# Patient Record
Sex: MALE | Race: WHITE | NOT HISPANIC OR LATINO | Employment: FULL TIME | URBAN - METROPOLITAN AREA
[De-identification: names, ages, dates, MRNs, and addresses within clinical notes are randomized per-mention and may not be internally consistent; named-entity substitution may affect disease eponyms.]

---

## 2018-01-15 NOTE — PROGRESS NOTES
Assessment    1  Encounter for preventive health examination (V70 0) (Z00 00)   2  Lightheadedness (780 4) (R42)   3  Elevated blood pressure reading without diagnosis of hypertension (796 2) (R03 0)    Plan   Lightheadedness    · (1) CBC/PLT/DIFF; Status:Active; Requested for:05Jul2016;    · ([facility]) CMP14+eGFR; Status:Active; Requested for:05Jul2016;   Screening cholesterol level    · (LC) Lipid Panel; Status:Active; Requested for:05Jul2016;   Screening for diabetes mellitus (DM)    · (LC) Hemoglobin A1c; Status:Active; Requested for:05Jul2016; Adacel 5-2-15 5 LF-MCG/0 5 Intramuscular Suspension; INJECT 0 5  ML Intramuscular; Dose:  5ml; Route: Intramuscular; Site: Left Deltoid; Done: [id number] 11:00AM; Status: Complete - Retrospective Authorization;  Ordered  For: Need for Tdap vaccination; Ordered By:Oliva Sofia; Effective Date:05Jul2016; Administered by: Madeline Guzman: 7/5/2016 11:00:00 AM; Last Updated By: Madeline Guzman; 7/5/2016 12:43:53 PM     Discussion/Summary  Impression: health maintenance visit, healthy adult male  Currently, he eats an adequate diet and has an adequate exercise regimen  Colorectal cancer screening: colorectal cancer screening is not indicated  Screening lab work includes glucose and lipid profile  The immunizations are needed and immunizations will be given as outlined in the orders  Advice and education were given regarding nutrition, aerobic exercise and reproductive health  Patient discussion: discussed with the patient  BP elevated even on recheck  Possible correlation to lightheadedness  Other possible cause of lightheadedness may be viral given GI symptoms  Will f/u in 2 weeks for BP check  Instructed to try to take BP outside office a few times before next appointment  Will order screening labs given family h/o CAD  Possible side effects of new medications were reviewed with the patient/guardian today        Chief Complaint  Patient is here for PE, no forms History of Present Illness  HM, Adult Male: The patient is being seen for a health maintenance evaluation  General Health: The patient's health since the last visit is described as good  He has regular dental visits  He denies vision problems  He denies hearing loss  Immunizations status: not up to date The patient needs the following immunization(s): tetanus vaccine  Lifestyle:  He consumes a diverse and healthy diet  He exercises regularly  He does not use tobacco  He consumes alcohol  He reports occasional alcohol use  He denies drug use  Reproductive health:  the patient is sexually active  birth control is being practiced  Screening: Prostate cancer screening includes no previous evaluation  Colorectal cancer screening includes no previous screening  Metabolic screening includes no previous lipid profile, no previous glucose screening and no previous thyroid function test      Cardiovascular risk factors: family history of cardiovascular disease  Risk findings: no unsafe sexual behavior, no anxiety symptoms, no depression symptoms, no travel to developing areas and no tuberculosis exposure  HPI: Has been feeling lightheadedness, dizzy, nauseated  Having frequent BMs  Loose  Denies blood in stool  No fainting episodes  Feels like he may faint  Has not blacked out  Denies balance issues  Denies vomiting  No sick contacts  Denies fever,chills  Has been drinking a lot of water  Appetite has been normal  No alcohol  Has been getting HA's more frequently  No chest pain, palpitations, sob  Denies abdominal pain  Family h/o CAD  Review of Systems    Constitutional: no fever, not feeling poorly, no chills and not feeling tired  Eyes: No complaints of eye pain, no red eyes, no discharge from eyes, no itchy eyes  ENT: no complaints of earache, no hearing loss, no nosebleeds, no nasal discharge, no sore throat, no hoarseness  Cardiovascular: as noted in HPI     Respiratory: as noted in HPI, no cough and no wheezing  Gastrointestinal: as noted in HPI  Genitourinary: No complaints of dysuria, no incontinence, no hesitancy, no nocturia, no genital lesion, no testicular pain  Musculoskeletal: No complaints of arthralgia, no myalgias, no joint swelling or stiffness, no limb pain or swelling  Integumentary: no rashes and no skin lesions  Neurological: as noted in HPI  Psychiatric: no anxiety, no sleep disturbances and no depression  Active Problems    1  Hemorrhoid (455 6) (K64 9)    Family History  Grandmother    · Family history of Coronary artery disease   · Family history of diabetes mellitus (V18 0) (Z83 3)   · Family history of hypertension (V17 49) (Z82 49)   · Family history of malignant neoplasm of breast (V16 3) (Z80 3)  Grandfather    · Family history of Coronary artery disease   · Family history of diabetes mellitus (V18 0) (Z83 3)   · Family history of hypertension (V17 49) (Z82 49)    Social History    · Denied: History of Alcohol use   · College student   · Denied: History of Drug use   · Never a smoker    Current Meds   1  No Reported Medications Recorded    Allergies    1  No Known Drug Allergies    Vitals   Recorded: [id number] 10:15AM Recorded: [id number] 09:47AM   Temperature  97 3 F, Tympanic   Heart Rate  60   Systolic 613 148   Diastolic 98 90   Height  6 ft 1 in   Weight  193 lb 9 6 oz   BMI Calculated  25 54   BSA Calculated  2 12     Physical Exam    Constitutional   General appearance: No acute distress, well appearing and well nourished  Head and Face   Head and face: Normal     Eyes   Conjunctiva and lids: No erythema, swelling or discharge  Pupils and irises: Equal, round, reactive to light  Ears, Nose, Mouth, and Throat   External inspection of ears and nose: Normal     Otoscopic examination: Tympanic membranes translucent with normal light reflex  Canals patent without erythema  Lips, teeth, and gums: Normal, good dentition      Oropharynx: Normal with no erythema, edema, exudate or lesions  Neck   Neck: Supple, symmetric, trachea midline, no masses  Thyroid: Normal, no thyromegaly  Pulmonary   Respiratory effort: No increased work of breathing or signs of respiratory distress  Auscultation of lungs: Clear to auscultation  Cardiovascular   Auscultation of heart: Normal rate and rhythm, normal S1 and S2, no murmurs  Carotid pulses: 2+ bilaterally  no bruit heard over the right carotid and no bruit heard over the left carotid  Examination of extremities for edema and/or varicosities: Normal     Abdomen   Abdomen: Non-tender, no masses  Liver and spleen: No hepatomegaly or splenomegaly  Lymphatic   Palpation of lymph nodes in neck: No lymphadenopathy  Musculoskeletal   Gait and station: Normal     Muscle strength/tone: Normal     Skin   Skin and subcutaneous tissue: Normal without rashes or lesions  Palpation of skin and subcutaneous tissue: Normal turgor  Neurologic   Cranial nerves: Cranial nerves 2-12 intact  Reflexes: 2+ and symmetric  Psychiatric   Judgment and insight: Normal     Orientation to person, place and time: Normal     Mood and affect: Normal        Results/Data  PHQ-2 Adult Depression Screening [id number] 09:49AM User, s     Test Name Result Flag Reference   PHQ-2 Adult Depression Score 1     Over the last two weeks, how often have you been bothered by any of the following problems? Little interest or pleasure in doing things: Several days - 1  Feeling down, depressed, or hopeless: Not at all - 0   PHQ-2 Adult Depression Screening Negative         Procedure    Procedure: Visual Acuity Test    Indication: routine screening  Inforrmation supplied by a Snellen chart  Results: 20/20 in the right eye without corrective device, 20/20 in the left eye without corrective device normal in both eyes        Signatures   Electronically signed by : Ana Weeks, [facility]; Jul 5 2016 12:19PM EST (Author)    Electronically signed by : Krissy Hawk DO; Jul 5 2016 12:46PM EST                       (Author)

## 2018-01-17 NOTE — RESULT NOTES
Message   Please call pt and let him know his bad cholesterol is high and good cholesterol low  He needs to schedule the BP f/u in 2 weeks as I advised and we can discuss in more detail        Verified Results  (1) CBC/PLT/DIFF [id number] 12:00AM Mary Jane Barrientos     Test Name Result Flag Reference   WBC 6 5 x10E3/uL  3 4-10 8   RBC 4 78 x10E6/uL  4 14-5 80   Hemoglobin 14 5 g/dL  12 6-17 7   Hematocrit 42 6 %  37 5-51 0   MCV 89 fL  79-97   MCH 30 3 pg  26 6-33 0   MCHC 34 0 g/dL  31 5-35 7   RDW 13 3 %  12 3-15 4   Platelets 907 I49A3/OP  150-379   Neutrophils 38 %     Lymphs 50 %     Monocytes 8 %     Eos 3 %     Basos 1 %     Neutrophils (Absolute) 2 5 x10E3/uL  1 4-7 0   Lymphs (Absolute) 3 2 x10E3/uL H 0 7-3 1   Monocytes(Absolute) 0 5 x10E3/uL  0 1-0 9   Eos (Absolute) 0 2 x10E3/uL  0 0-0 4   Baso (Absolute) 0 0 x10E3/uL  0 0-0 2   Immature Granulocytes 0 %     Immature Grans (Abs) 0 0 x10E3/uL  0 0-0 1     (LC) CMP14+eGFR [id number] 12:00AM Kimberlyn Nicky     Test Name Result Flag Reference   Glucose, Serum 87 mg/dL  65-99   BUN 12 mg/dL  6-20   Creatinine, Serum 0 90 mg/dL  0 76-1 27   eGFR If NonAfricn Am 122 mL/min/1 73  >59   eGFR If Africn Am 141 mL/min/1 73  >59   BUN/Creatinine Ratio 13  8-19   Sodium, Serum 144 mmol/L  134-144   Potassium, Serum 4 8 mmol/L  3 5-5 2   Chloride, Serum 103 mmol/L     Carbon Dioxide, Total 25 mmol/L  18-29   Calcium, Serum 9 3 mg/dL  8 7-10 2   Protein, Total, Serum 7 3 g/dL  6 0-8 5   Albumin, Serum 4 5 g/dL  3 5-5 5   Globulin, Total 2 8 g/dL  1 5-4 5   A/G Ratio 1 6  1 1-2 5   Bilirubin, Total 0 2 mg/dL  0 0-1 2   Alkaline Phosphatase, S 81 IU/L     AST (SGOT) 27 IU/L  0-40   ALT (SGPT) 22 IU/L  0-44     (LC) Lipid Panel [id number] 12:00AM Nicky Sofia     Test Name Result Flag Reference   Cholesterol, Total 202 mg/dL H 100-199   Triglycerides 142 mg/dL  0-149   HDL Cholesterol 36 mg/dL L >39   According to ATP-III Guidelines, HDL-C >59 mg/dL is considered a  negative risk factor for CHD     VLDL Cholesterol Derrick 28 mg/dL  5-40   LDL Cholesterol Calc 138 mg/dL H 0-99

## 2020-07-24 ENCOUNTER — OFFICE VISIT (OUTPATIENT)
Dept: FAMILY MEDICINE CLINIC | Facility: HOSPITAL | Age: 25
End: 2020-07-24
Payer: COMMERCIAL

## 2020-07-24 VITALS
BODY MASS INDEX: 29.34 KG/M2 | WEIGHT: 221.4 LBS | DIASTOLIC BLOOD PRESSURE: 78 MMHG | HEIGHT: 73 IN | SYSTOLIC BLOOD PRESSURE: 124 MMHG | HEART RATE: 94 BPM | TEMPERATURE: 97.7 F

## 2020-07-24 DIAGNOSIS — Z13.6 SCREENING FOR CARDIOVASCULAR CONDITION: ICD-10-CM

## 2020-07-24 DIAGNOSIS — Z13.1 SCREENING FOR DIABETES MELLITUS (DM): ICD-10-CM

## 2020-07-24 DIAGNOSIS — N50.812 TESTICULAR PAIN, LEFT: ICD-10-CM

## 2020-07-24 DIAGNOSIS — Z00.00 ANNUAL PHYSICAL EXAM: Primary | ICD-10-CM

## 2020-07-24 PROCEDURE — 99395 PREV VISIT EST AGE 18-39: CPT | Performed by: FAMILY MEDICINE

## 2020-07-24 PROCEDURE — 3008F BODY MASS INDEX DOCD: CPT | Performed by: FAMILY MEDICINE

## 2020-07-24 NOTE — PROGRESS NOTES
Yana Sharp MD    NAME: Tino Strong  AGE: 22 y o  SEX: male  : 1995     DATE: 2020     Assessment and Plan:     Problem List Items Addressed This Visit     None      Visit Diagnoses     Annual physical exam    -  Primary    Testicular pain, left        as he relates probably chronic epididymitis  US of the scrotum ordered  f/u with urology  Previous STI testing negative  Relevant Orders    Ambulatory referral to Urology    US scrotum and testicles    Screening for cardiovascular condition        Relevant Orders    CBC    Lipid Panel with Direct LDL reflex    Comprehensive metabolic panel    Screening for diabetes mellitus (DM)        Relevant Orders    Lipid Panel with Direct LDL reflex    Comprehensive metabolic panel          Immunizations and preventive care screenings were discussed with patient today  Appropriate education was printed on patient's after visit summary  Counseling:  Alcohol/drug use: discussed moderation in alcohol intake, the recommendations for healthy alcohol use, and avoidance of illicit drug use  Dental Health: discussed importance of regular tooth brushing, flossing, and dental visits  Injury prevention: discussed safety/seat belts, safety helmets, smoke detectors, carbon dioxide detectors, and smoking near bedding or upholstery  Sexual health: discussed sexually transmitted diseases, partner selection, use of condoms, avoidance of unintended pregnancy, and contraceptive alternatives  Exercise: the importance of regular exercise/physical activity was discussed  Recommend exercise 3-5 times per week for at least 30 minutes  · Discussed but deferred HIv prophylaxis  No follow-ups on file       Chief Complaint:     Chief Complaint   Patient presents with   [facility] [place]    Testicle Pain      History of Present Illness:     Adult Annual Physical   Patient here for a comprehensive physical exam  The patient reports pain in the left testicle  intermittent over the past few years  had recent sti testing and negative  no urinary stympoms of dysuria or frequency       Diet and Physical Activity  · Diet/Nutrition: well balanced diet  · Exercise: moderate cardiovascular exercise  Depression Screening  PHQ-9 Depression Screening    PHQ-9:    Frequency of the following problems over the past two weeks:       Little interest or pleasure in doing things:  0 - not at all  Feeling down, depressed, or hopeless:  0 - not at all  PHQ-2 Score:  0       General Health  · Sleep: sleeps well  · Hearing: normal - bilateral   · Vision: no vision problems  · Dental: no dental visits for >1 year   Health  · History of STDs?: no      Review of Systems:     Review of Systems   Constitutional: Negative  Negative for activity change, appetite change, chills and diaphoresis  HENT: Negative for congestion and dental problem  Respiratory: Negative  Negative for apnea, chest tightness, shortness of breath and wheezing  Cardiovascular: Negative  Negative for chest pain, palpitations and leg swelling  Gastrointestinal: Negative  Negative for abdominal distention, abdominal pain, constipation, diarrhea and nausea  Genitourinary: Negative  Negative for difficulty urinating, dysuria and frequency  Past Medical History:     History reviewed  No pertinent past medical history  Past Surgical History:     History reviewed  No pertinent surgical history     Social History:        Social History     Socioeconomic History    Marital status: Single     Spouse name: None    Number of children: None    Years of education: None    Highest education level: None   Occupational History    None   Social Needs    Financial resource strain: None    Food insecurity:     Worry: None     Inability: None    Transportation needs:     Medical: None     Non-medical: None   Tobacco Use    Smoking status: Never Smoker    Smokeless tobacco: Never Used   Substance and Sexual Activity    Alcohol use: Not Currently     Comment: Socially     Drug use: Never    Sexual activity: None   Lifestyle    Physical activity:     Days per week: None     Minutes per session: None    Stress: None   Relationships    Social connections:     Talks on phone: None     Gets together: None     Attends Denominational service: None     Active member of club or organization: None     Attends meetings of clubs or organizations: None     Relationship status: None    Intimate partner violence:     Fear of current or ex partner: None     Emotionally abused: None     Physically abused: None     Forced sexual activity: None   Other Topics Concern    None   Social History Narrative    None      Family History:     History reviewed  No pertinent family history  Current Medications:     No current outpatient medications on file  No current facility-administered medications for this visit  Allergies:     No Known Allergies   Physical Exam:     /78   Pulse 94   Temp 97 7 °F (36 5 °C)   Ht 6' 0 5" (1 842 m)   Wt 100 kg (221 lb 6 4 oz)   BMI 29 61 kg/m²     Physical Exam   Constitutional: He is oriented to person, place, and time  He appears well-developed and well-nourished  No distress  HENT:   Head: Normocephalic and atraumatic  Right Ear: External ear normal    Left Ear: External ear normal    Nose: Nose normal    Mouth/Throat: Oropharynx is clear and moist    Eyes: Pupils are equal, round, and reactive to light  Conjunctivae and EOM are normal    Neck: Normal range of motion  Neck supple  Cardiovascular: Normal rate, regular rhythm and normal heart sounds  Exam reveals no gallop and no friction rub  No murmur heard  Pulmonary/Chest: Effort normal and breath sounds normal  No respiratory distress  He has no wheezes  He has no rales  He exhibits no tenderness  Abdominal: Soft   Bowel sounds are normal  He exhibits no distension and no mass  There is no tenderness  There is no rebound and no guarding  No hernia  Hernia confirmed negative in the right inguinal area and confirmed negative in the left inguinal area  Genitourinary: Testes normal and penis normal  Right testis shows no mass and no tenderness  Left testis shows no mass and no tenderness  Circumcised  Musculoskeletal: Normal range of motion  Lymphadenopathy: No inguinal adenopathy noted on the right or left side  Neurological: He is alert and oriented to person, place, and time  Skin: Skin is warm  Psychiatric: He has a normal mood and affect  His behavior is normal  Judgment and thought content normal    Nursing note and vitals reviewed        Robet Ranks, MD Deneen Leyden MD

## 2020-07-24 NOTE — PATIENT INSTRUCTIONS

## 2020-08-24 ENCOUNTER — HOSPITAL ENCOUNTER (OUTPATIENT)
Dept: ULTRASOUND IMAGING | Facility: CLINIC | Age: 25
Discharge: HOME | End: 2020-08-24
Payer: COMMERCIAL

## 2020-08-24 DIAGNOSIS — N50.812 TESTICULAR PAIN, LEFT: ICD-10-CM

## 2020-08-24 PROCEDURE — 76870 US EXAM SCROTUM: CPT

## 2020-09-09 ENCOUNTER — CONSULT (OUTPATIENT)
Dept: UROLOGY | Facility: HOSPITAL | Age: 25
End: 2020-09-09
Payer: COMMERCIAL

## 2020-09-09 VITALS
BODY MASS INDEX: 29.16 KG/M2 | WEIGHT: 220 LBS | HEART RATE: 73 BPM | DIASTOLIC BLOOD PRESSURE: 88 MMHG | SYSTOLIC BLOOD PRESSURE: 132 MMHG | TEMPERATURE: 98 F | HEIGHT: 73 IN

## 2020-09-09 DIAGNOSIS — N50.812 TESTICULAR PAIN, LEFT: ICD-10-CM

## 2020-09-09 LAB
SL AMB  POCT GLUCOSE, UA: NORMAL
SL AMB LEUKOCYTE ESTERASE,UA: NORMAL
SL AMB POCT BILIRUBIN,UA: NORMAL
SL AMB POCT BLOOD,UA: NORMAL
SL AMB POCT CLARITY,UA: CLEAR
SL AMB POCT COLOR,UA: YELLOW
SL AMB POCT KETONES,UA: NORMAL
SL AMB POCT NITRITE,UA: NORMAL
SL AMB POCT PH,UA: 7
SL AMB POCT SPECIFIC GRAVITY,UA: 1.01
SL AMB POCT URINE PROTEIN: NORMAL
SL AMB POCT UROBILINOGEN: 0.2

## 2020-09-09 PROCEDURE — 1036F TOBACCO NON-USER: CPT | Performed by: NURSE PRACTITIONER

## 2020-09-09 PROCEDURE — 99244 OFF/OP CNSLTJ NEW/EST MOD 40: CPT | Performed by: NURSE PRACTITIONER

## 2020-09-09 PROCEDURE — 81002 URINALYSIS NONAUTO W/O SCOPE: CPT | Performed by: NURSE PRACTITIONER

## 2020-09-09 NOTE — PROGRESS NOTES
9/9/2020    Yun Jones  1995  [id number]      Assessment  -Testicular discomfort    Discussion/Plan  Tammy William is a 22 y o  male who presents in consultation     1  Testicular discomfort- we reviewed the results of his recent ultrasound which identified possible fat containing inguinal hernia and nonvisualization of left testicle within scrotal sac  Based on findings, and physical examination, we discussed obtaining a CT scan for further evaluation  At this time, patient states that his discomfort has improved and he wishes to hold off at this time  We reviewed conservative management should his discomfort return  He will call our office with any issues in if he is interested in obtaining a CT scan     -All questions answered, patient agrees with plan      History of Present Illness  22 y o  male who presents in consultation for evaluation of left-sided testicular discomfort  Patient states he has had intermittent discomfort for the last 3 years  Patient states pain initially was sharp, but has improved and is in intermittent dull ache  He was presumed to have epididymitis at that time  Patient was recently evaluated via telemedicine visit from an online physician  He was informed to follow up with his PCP  Patient recently underwent a scrotal ultrasound which identified left atrophic testicle and possible left inguinal hernia  Patient denies any swelling or redness  He denies any lower urinary tract symptoms, gross hematuria, or dysuria  Patient states he had been engaging in heavy lifting when discomfort started  He denies any prior history of testicular torsion or undescended testicle  Patient does state he was treated for acute epididymitis when he was 15years of age  Patient denies any strong family history of prostate or testicular malignancy  Review of Systems  Review of Systems   Constitutional: Negative  HENT: Negative  Respiratory: Negative  Cardiovascular: Negative  Gastrointestinal: Negative  Genitourinary: Positive for testicular pain  Negative for decreased urine volume, difficulty urinating, dysuria, flank pain, frequency, hematuria, scrotal swelling and urgency  Musculoskeletal: Negative  Skin: Negative  Neurological: Negative  Psychiatric/Behavioral: Negative  Past Medical History  History reviewed  No pertinent past medical history  Past Social History  History reviewed  No pertinent surgical history      Past Family History  Family History   Problem Relation Age of Onset    Diabetes Father     Cancer Paternal Aunt     Diabetes Paternal Grandmother        Past Social history  Social History     Socioeconomic History    Marital status: Single     Spouse name: Not on file    Number of children: Not on file    Years of education: Not on file    Highest education level: Not on file   Occupational History    Not on file   Social Needs    Financial resource strain: Not on file    Food insecurity     Worry: Not on file     Inability: Not on file   [employer] needs     Medical: Not on file     Non-medical: Not on file   Tobacco Use    Smoking status: Never Smoker    Smokeless tobacco: Never Used   Substance and Sexual Activity    Alcohol use: Not Currently     Comment: Socially     Drug use: Never    Sexual activity: Not on file   Lifestyle    Physical activity     Days per week: Not on file     Minutes per session: Not on file    Stress: Not on file   Relationships    Social connections     Talks on phone: Not on file     Gets together: Not on file     Attends Scientology service: Not on file     Active member of club or organization: Not on file     Attends meetings of clubs or organizations: Not on file     Relationship status: Not on file    Intimate partner violence     Fear of current or ex partner: Not on file     Emotionally abused: Not on file     Physically abused: Not on file     Forced sexual activity: Not on file Other Topics Concern    Not on file   Social History Narrative    Not on file       Current Medications  No current outpatient medications on file  No current facility-administered medications for this visit  Allergies  No Known Allergies    Past Medical History, Social History, Family History, medications and allergies were reviewed  Vitals  Vitals:    09/09/20 0920   BP: 132/88   BP Location: Left arm   Patient Position: Sitting   Cuff Size: Adult   Pulse: 73   Temp: 98 °F (36 7 °C)   Weight: 99 8 kg (220 lb)   Height: 6' 0 5" (1 842 m)       Physical Exam  Physical Exam  Constitutional:       Appearance: Normal appearance  He is well-developed  HENT:      Head: Normocephalic  Eyes:      Pupils: Pupils are equal, round, and reactive to light  Neck:      Musculoskeletal: Normal range of motion  Cardiovascular:      Rate and Rhythm: Normal rate and regular rhythm  Pulmonary:      Effort: Pulmonary effort is normal    Abdominal:      Palpations: Abdomen is soft  Genitourinary:     Penis: Normal        Scrotum/Testes: Normal       Comments: Testicles descended bilaterally, atrophic left testicle, probably left inguinal hernia palpated, scrotum nontender, no edema or erythema, penis circumcised   Musculoskeletal: Normal range of motion  Skin:     General: Skin is warm  Neurological:      General: No focal deficit present  Mental Status: He is alert and oriented to person, place, and time  Psychiatric:         Mood and Affect: Mood normal          Behavior: Behavior normal          Thought Content:  Thought content normal          Judgment: Judgment normal          Results    I have personally reviewed all pertinent lab results and reviewed with patient  No results found for: PSA  Lab Results   Component Value Date    GLUCOSE 87 07/06/2016    CALCIUM 9 3 07/06/2016     07/06/2016    K 4 8 07/06/2016    CO2 25 07/06/2016     07/06/2016    BUN 12 07/06/2016    CREATININE 0 90 07/06/2016     Lab Results   Component Value Date    WBC 6 5 07/06/2016    HGB 14 5 07/06/2016    HCT 42 6 07/06/2016    MCV 89 07/06/2016     07/06/2016     No results found for this or any previous visit (from the past 1 hour(s))

## 2021-04-07 DIAGNOSIS — Z23 ENCOUNTER FOR IMMUNIZATION: ICD-10-CM

## 2021-07-14 ENCOUNTER — TELEPHONE (OUTPATIENT)
Dept: UROLOGY | Facility: AMBULATORY SURGERY CENTER | Age: 26
End: 2021-07-14

## 2021-07-14 DIAGNOSIS — N50.819 PAIN IN TESTICLE, UNSPECIFIED LATERALITY: Primary | ICD-10-CM

## 2021-07-14 NOTE — TELEPHONE ENCOUNTER
LM that Ct scan was ordered and he can call Central scheduleding @ [phone] to schedule appt    He can call us back to schedule an appointment to review for after scheduled Ct Scan

## 2021-07-14 NOTE — TELEPHONE ENCOUNTER
----- Message from Dwayne Davis sent at 7/13/2021  3:21 PM EDT -----  Regarding: Visit Follow-Up Question  Contact: Zari Farias,    I am hopeful this email finds you well  We met last Summer or Fall in regards to testicular pain, a probable hernia, and (wait for it) an essentially missing testicle  I was wondering if we could do another follow up at some point to identify root causes, set in motion a CT scan (I believe this is what was requested)  I want to ensure I'm in relative good health as I move towards a new portion of my life      I look forward to your reply!    -Dwayne Davis

## 2021-07-16 ENCOUNTER — TELEPHONE (OUTPATIENT)
Dept: CT IMAGING | Facility: HOSPITAL | Age: 26
End: 2021-07-16

## 2021-07-23 ENCOUNTER — HOSPITAL ENCOUNTER (OUTPATIENT)
Dept: CT IMAGING | Facility: HOSPITAL | Age: 26
Discharge: HOME | End: 2021-07-23
Payer: COMMERCIAL

## 2021-07-23 DIAGNOSIS — N50.819 PAIN IN TESTICLE, UNSPECIFIED LATERALITY: ICD-10-CM

## 2021-07-23 PROCEDURE — 74177 CT ABD & PELVIS W/CONTRAST: CPT

## 2021-07-23 PROCEDURE — G1004 CDSM NDSC: HCPCS

## 2021-07-23 RX ADMIN — IOHEXOL 100 ML: 350 INJECTION, SOLUTION INTRAVENOUS at 09:13

## 2021-07-23 NOTE — TELEPHONE ENCOUNTER
Please inform patient results of CT scan showed no evidence of inguinal hernia or urologic findings

## 2022-06-21 ENCOUNTER — TELEPHONE (OUTPATIENT)
Dept: FAMILY MEDICINE CLINIC | Facility: HOSPITAL | Age: 27
End: 2022-06-21

## 2022-06-28 NOTE — TELEPHONE ENCOUNTER
06/28/22 1:37 PM        Thank you for your request  Your request has been received, reviewed, and the patient chart updated  The PCP has successfully been removed with a patient attribution note  This message will now be completed          Thank you  Silvestre Howe

## 2024-10-02 ENCOUNTER — OFFICE VISIT (OUTPATIENT)
Dept: FAMILY MEDICINE CLINIC | Facility: HOSPITAL | Age: 29
End: 2024-10-02
Payer: COMMERCIAL

## 2024-10-02 VITALS
BODY MASS INDEX: 33.27 KG/M2 | WEIGHT: 251 LBS | HEIGHT: 73 IN | OXYGEN SATURATION: 99 % | DIASTOLIC BLOOD PRESSURE: 80 MMHG | SYSTOLIC BLOOD PRESSURE: 128 MMHG | HEART RATE: 80 BPM

## 2024-10-02 DIAGNOSIS — R11.0 NAUSEA: ICD-10-CM

## 2024-10-02 DIAGNOSIS — E66.811 CLASS 1 OBESITY WITHOUT SERIOUS COMORBIDITY WITH BODY MASS INDEX (BMI) OF 33.0 TO 33.9 IN ADULT, UNSPECIFIED OBESITY TYPE: ICD-10-CM

## 2024-10-02 DIAGNOSIS — Z13.6 SCREENING FOR CARDIOVASCULAR CONDITION: ICD-10-CM

## 2024-10-02 DIAGNOSIS — Z00.00 ANNUAL PHYSICAL EXAM: Primary | ICD-10-CM

## 2024-10-02 PROCEDURE — 99385 PREV VISIT NEW AGE 18-39: CPT

## 2024-10-02 PROCEDURE — 99203 OFFICE O/P NEW LOW 30 MIN: CPT

## 2024-10-02 NOTE — ASSESSMENT & PLAN NOTE
Unclear etiology, concern for acid reflux due to 2 to 3 cups of coffee per day.    Plan  Offered patient a course of PPI versus Zofran as needed.  Patient declines, would like to continue to monitor symptoms.  I am agreeable to this plan.

## 2024-10-02 NOTE — PROGRESS NOTES
Adult Annual Physical  Name: Jacoby Young      : 1995      MRN: [id number]  Encounter Provider: Michelle Hodges DO  Encounter Date: 10/2/2024   Encounter department: [facility] [address]    Assessment & Plan  Annual physical exam         Nausea  Unclear etiology, concern for acid reflux due to 2 to 3 cups of coffee per day.    Plan  Offered patient a course of PPI versus Zofran as needed.  Patient declines, would like to continue to monitor symptoms.  I am agreeable to this plan.         Class 1 obesity without serious comorbidity with body mass index (BMI) of 33.0 to 33.9 in adult, unspecified obesity type    Orders:    Lipid panel; Future    Hemoglobin A1C; Future    Lipid panel    Hemoglobin A1C    Screening for cardiovascular condition    Orders:    Lipid panel; Future    Hemoglobin A1C; Future    Lipid panel    Hemoglobin A1C    Immunizations and preventive care screenings were discussed with patient today. Appropriate education was printed on patient's after visit summary.    Counseling:  Exercise: the importance of regular exercise/physical activity was discussed. Recommend exercise 3-5 times per week for at least 30 minutes.     BMI Counseling: Body mass index is 33.57 kg/m². The BMI is above normal. Exercise recommendations include exercising 3-5 times per week.         History of Present Illness     Adult Annual Physical:  Patient presents for annual physical. No concerns    Nausea  x2-3 weeks  No change in diet  Sometimes after eating 4-5 minutes, Will pass on its own  No trigger pt can identify  2-3 cups coffee/day  Denies vomiting, hemoptysis  Denies wanting any medications.     Diet and Physical Activity:  - Diet/Nutrition: well balanced diet. working to not eat out  - Exercise:. 20-30 minutes/day    Depression Screening:  - PHQ-2 Score: 2    General Health:  - Sleep: 4-6 hours of sleep on average.  - Hearing: normal hearing bilateral ears.  - Vision: no vision  problems.  - Dental: regular dental visits.     Health:    - Urinary symptoms: none.       Preventative Screening  Sexually active? yes  STD screeningdeclines  Colon: Last colonoscopy denies history  Reports Family history of breast cancer. Denies fam h/o colon cancer, prostate cancer  Labs: Hgb A1C ordered, FLP ordered    Immunizations  Last TDAP 2016  Last flu agreeable to get today    BMI Counseling: Body mass index is 33.57 kg/m². The BMI is above normal. Nutrition recommendations include decreasing overall calorie intake. Exercise recommendations include moderate aerobic physical activity for 150 minutes/week.    Wt Readings from Last 3 Encounters:   10/02/24 114 kg (251 lb)   09/09/20 99.8 kg (220 lb)   07/24/20 100 kg (221 lb 6.4 oz)       PHQ-2/9 Depression Screening    Little interest or pleasure in doing things: 1 - several days  Feeling down, depressed, or hopeless: 1 - several days  PHQ-2 Score: 2  PHQ-2 Interpretation: Negative depression screen             Family History  documented    Allergies  Some seasonal, but mild    Social History  Tobacco - denies history  Alcohol - 1x/week  Illicit Drugs - denies        Review of Systems   Respiratory:  Negative for shortness of breath.    Cardiovascular:  Negative for chest pain.   Gastrointestinal:  Positive for nausea. Negative for abdominal pain and vomiting.   Skin:  Negative for rash.   Allergic/Immunologic: Positive for environmental allergies. Negative for food allergies.   Psychiatric/Behavioral:  Negative for sleep disturbance.      No current outpatient medications on file prior to visit.     No current facility-administered medications on file prior to visit.      Social History     Tobacco Use    Smoking status: Never    Smokeless tobacco: Never   Vaping Use    Vaping status: Never Used   Substance and Sexual Activity    Alcohol use: Not Currently     Comment: Socially     Drug use: Never    Sexual activity: Not on file       Objective     BP  "128/80   Pulse 80   Ht 6' 0.5\" (1.842 m)   Wt 114 kg (251 lb)   SpO2 99%   BMI 33.57 kg/m²     Physical Exam  Vitals reviewed. Exam conducted with a chaperone present.   Constitutional:       General: He is not in acute distress.     Appearance: Normal appearance. He is obese. He is not ill-appearing.   HENT:      Head: Normocephalic and atraumatic.      Right Ear: Tympanic membrane, ear canal and external ear normal. There is no impacted cerumen.      Left Ear: Tympanic membrane, ear canal and external ear normal. There is no impacted cerumen.   Cardiovascular:      Rate and Rhythm: Normal rate and regular rhythm.      Heart sounds: Normal heart sounds.   Pulmonary:      Breath sounds: Normal breath sounds.   Neurological:      Mental Status: He is alert.   Psychiatric:         Mood and Affect: Mood normal.         Behavior: Behavior normal.         Michelle Hodges, DO  Family Medicine    "

## 2025-03-20 DIAGNOSIS — Z13.6 SCREENING FOR CARDIOVASCULAR CONDITION: ICD-10-CM

## 2025-03-20 DIAGNOSIS — Z72.51 HIGH RISK SEXUAL BEHAVIOR, UNSPECIFIED TYPE: Primary | ICD-10-CM

## 2025-03-20 DIAGNOSIS — E66.811 CLASS 1 OBESITY WITHOUT SERIOUS COMORBIDITY WITH BODY MASS INDEX (BMI) OF 33.0 TO 33.9 IN ADULT, UNSPECIFIED OBESITY TYPE: ICD-10-CM
